# Patient Record
Sex: FEMALE | Race: WHITE
[De-identification: names, ages, dates, MRNs, and addresses within clinical notes are randomized per-mention and may not be internally consistent; named-entity substitution may affect disease eponyms.]

---

## 2018-02-12 ENCOUNTER — HOSPITAL ENCOUNTER (OUTPATIENT)
Dept: HOSPITAL 45 - C.RADBC | Age: 33
Discharge: HOME | End: 2018-02-12
Attending: NURSE PRACTITIONER
Payer: COMMERCIAL

## 2018-02-12 DIAGNOSIS — M25.532: Primary | ICD-10-CM

## 2018-02-12 NOTE — DIAGNOSTIC IMAGING REPORT
L WRIST MIN 3 VIEWS ROUTINE, L HAND MIN 3 VIEWS ROUTINE



HISTORY:  32 years-old Female PAIN AND SWELLING LEFT WRIST acute left hand and

wrist pain



COMPARISON: None available



TECHNIQUE: 4 views of the left wrist and 3 views of the left hand



FINDINGS: 



WRIST:

There is no acute fracture, dislocation or significant degenerative changes.

Mild dorsal wrist soft tissue swelling without opaque foreign body.



WRIST:

No acute fracture, dislocation or significant degenerative changes.



IMPRESSION: Mild dorsal wrist soft tissue swelling without acute fracture or

dislocation. 







The above report was generated using voice recognition software. It may contain

grammatical, syntax or spelling errors.







Electronically signed by:  Chauncey Ferro M.D.

2/12/2018 4:24 PM



Dictated Date/Time:  2/12/2018 4:22 PM